# Patient Record
Sex: MALE | Race: BLACK OR AFRICAN AMERICAN | NOT HISPANIC OR LATINO | URBAN - METROPOLITAN AREA
[De-identification: names, ages, dates, MRNs, and addresses within clinical notes are randomized per-mention and may not be internally consistent; named-entity substitution may affect disease eponyms.]

---

## 2019-05-15 ENCOUNTER — EMERGENCY (EMERGENCY)
Facility: HOSPITAL | Age: 34
LOS: 1 days | Discharge: HOME | End: 2019-05-15
Attending: EMERGENCY MEDICINE | Admitting: EMERGENCY MEDICINE
Payer: COMMERCIAL

## 2019-05-15 VITALS
TEMPERATURE: 99 F | DIASTOLIC BLOOD PRESSURE: 75 MMHG | WEIGHT: 167.99 LBS | RESPIRATION RATE: 18 BRPM | SYSTOLIC BLOOD PRESSURE: 126 MMHG | OXYGEN SATURATION: 98 % | HEART RATE: 57 BPM

## 2019-05-15 VITALS
OXYGEN SATURATION: 99 % | HEART RATE: 60 BPM | SYSTOLIC BLOOD PRESSURE: 122 MMHG | TEMPERATURE: 98 F | RESPIRATION RATE: 16 BRPM | DIASTOLIC BLOOD PRESSURE: 74 MMHG

## 2019-05-15 PROCEDURE — 99283 EMERGENCY DEPT VISIT LOW MDM: CPT | Mod: 25

## 2019-05-15 PROCEDURE — 96372 THER/PROPH/DIAG INJ SC/IM: CPT

## 2019-05-15 PROCEDURE — 99283 EMERGENCY DEPT VISIT LOW MDM: CPT

## 2019-05-15 RX ORDER — KETOROLAC TROMETHAMINE 30 MG/ML
30 SYRINGE (ML) INJECTION ONCE
Refills: 0 | Status: DISCONTINUED | OUTPATIENT
Start: 2019-05-15 | End: 2019-05-15

## 2019-05-15 RX ORDER — LIDOCAINE 4 G/100G
1 CREAM TOPICAL ONCE
Refills: 0 | Status: COMPLETED | OUTPATIENT
Start: 2019-05-15 | End: 2019-05-15

## 2019-05-15 RX ORDER — METHOCARBAMOL 500 MG/1
2 TABLET, FILM COATED ORAL
Qty: 30 | Refills: 0
Start: 2019-05-15 | End: 2019-05-19

## 2019-05-15 RX ORDER — IBUPROFEN 200 MG
1 TABLET ORAL
Qty: 20 | Refills: 0
Start: 2019-05-15 | End: 2019-05-19

## 2019-05-15 RX ADMIN — Medication 30 MILLIGRAM(S): at 15:00

## 2019-05-15 RX ADMIN — LIDOCAINE 1 PATCH: 4 CREAM TOPICAL at 15:00

## 2019-05-15 NOTE — ED PROVIDER NOTE - ATTENDING CONTRIBUTION TO CARE
34 yo M pw L shoulder / neck pain x past few weeks. No known direct fall / trauma. No numb/ting/focal weak. No HA/neck stiffness. no photophobia. NO cp/sob/palp. Inc pain with FROM of shoulder. no agg/allev factors. No other inj or co.  exam: MM Moist. neck supple. no meningeal signs. Lungs cta bl, no w/r/r. Nl resp effort.  L shoulder: FROM without pain. Nl str in all isolated movements, prox and distally. No other acute co.  Po meds, outpt ortho fu for definitive management / diagnosis.

## 2019-05-15 NOTE — ED PROVIDER NOTE - CLINICAL SUMMARY MEDICAL DECISION MAKING FREE TEXT BOX
pt with muscle spasm. will give toradol and lidoderm in ed. will rx ibuprofen and robaxin. 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 Advised ortho follow up.. All questions answered and concerns addressed. pt verbalized understanding and agreement with plan and dx. pt advised on next step and when/where to follow up. pt advised on all take home and otc medications. pt advised to follow up with PMD. pt advised to return to ed for worsenng symptoms including fever, cp, sob. will dc.

## 2019-05-15 NOTE — ED PROVIDER NOTE - OBJECTIVE STATEMENT
pt is a 32yo male with no significant pmhx c/o neck pain x weeks. pt reports intermittent left sided neck pain radiating to shoulder without numbness or tingling. 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 pt took motrin 2 times for pain, none today. pt reports pain is worse in the morning and improves throughout the day. pt denies fever, cp, sob, numbness, weakness.

## 2019-05-15 NOTE — ED PROVIDER NOTE - NSFOLLOWUPINSTRUCTIONS_ED_ALL_ED_FT
1. FOLLOW UP WITH YOUR DOCTOR IN 24-48 HOURS.  2. FOLLOW UP WITH ALL SPECIALIST DISCUSSED DURING YOUR VISIT.  3. TAKE IBUPROFEN 600MG EVERY 6 HOURS WITH FOOD AS NEEDED FOR PAIN.  4.IF PAIN PERSISTS, ADD ROBAXIN AT BEDTIME ONCE DAILY. DO NOT DRIVE WHEN TAKING ROBAXIN. RETURN FOR WORSENING SYMPTOMS.  5. USE WARM COMPRESS ON NECK AS NEEDED FOR PAIN  6. BUY OVER THE COUNTER SALONPAS LIDOCAINE PATCHES FOR NECK/ BACK PAIN AND USE AS NEEDED.

## 2019-05-15 NOTE — ED PROVIDER NOTE - PHYSICAL EXAMINATION
Spine Exam:     Cervical: No erythema, ecchymosis, or visible deformity. No midline tenderness or step-off appreciated on palpation. No paravertebral tenderness. L SIDED  muscle spasm. FROM. NEG NEXUS criteria.          Thoracic: No erythema, ecchymosis, or visible deformity. No midline tenderness or step-off appreciated on palpation. No paravertebral tenderness. No muscle spasm.           Lumbosacral: No erythema, ecchymosis, or visible deformity. No midline tenderness or step-off appreciated on palpation. No paravertebral tenderness. No muscle spasm.   FROM BL LE SENSATION GROSSLY INTACT BL LE.+ 2 RADIAL  LUE: NONTENDER FROM.

## 2023-10-12 NOTE — ED ADULT NURSE NOTE - NS ED NURSE LEVEL OF CONSCIOUSNESS ORIENTATION
Continue use of methotrexate 4 tablets/week,  folic acid is 1 mg/day. For treatment of scleroderma. Also use Actemra injection  Once a week 162 mg. Follow-up with vascular doctor regarding your right carotid stenosis. Prednisone 5 mg per day. Tylenol m#3 rare use. Activity as tolerated. Labs with Dr. William Madrid every 3 months. RTO 6 months.
Oriented - self; Oriented - place; Oriented - time